# Patient Record
Sex: MALE | URBAN - METROPOLITAN AREA
[De-identification: names, ages, dates, MRNs, and addresses within clinical notes are randomized per-mention and may not be internally consistent; named-entity substitution may affect disease eponyms.]

---

## 2024-12-23 ENCOUNTER — OFFICE VISIT (OUTPATIENT)
Dept: URGENT CARE | Age: 54
End: 2024-12-23
Payer: COMMERCIAL

## 2024-12-23 ENCOUNTER — ANCILLARY PROCEDURE (OUTPATIENT)
Dept: URGENT CARE | Age: 54
End: 2024-12-23
Payer: COMMERCIAL

## 2024-12-23 VITALS
TEMPERATURE: 98.6 F | SYSTOLIC BLOOD PRESSURE: 141 MMHG | OXYGEN SATURATION: 97 % | DIASTOLIC BLOOD PRESSURE: 91 MMHG | HEART RATE: 88 BPM

## 2024-12-23 DIAGNOSIS — J06.9 UPPER RESPIRATORY TRACT INFECTION, UNSPECIFIED TYPE: ICD-10-CM

## 2024-12-23 DIAGNOSIS — R09.89 CHEST CONGESTION: Primary | ICD-10-CM

## 2024-12-23 LAB
POC BINAX EXPIRATION: NEGATIVE
POC BINAX NOW COVID SERIAL NUMBER: NEGATIVE
POC RAPID INFLUENZA A: NEGATIVE
POC RAPID INFLUENZA B: NEGATIVE
POC SARS-COV-2 AG BINAX: NORMAL

## 2024-12-23 PROCEDURE — 71046 X-RAY EXAM CHEST 2 VIEWS: CPT

## 2024-12-23 PROCEDURE — 99204 OFFICE O/P NEW MOD 45 MIN: CPT

## 2024-12-23 PROCEDURE — 87811 SARS-COV-2 COVID19 W/OPTIC: CPT

## 2024-12-23 PROCEDURE — 87804 INFLUENZA ASSAY W/OPTIC: CPT

## 2024-12-23 RX ORDER — LORATADINE 10 MG/1
10 TABLET ORAL DAILY
COMMUNITY

## 2024-12-23 RX ORDER — LOSARTAN POTASSIUM 50 MG/1
50 TABLET ORAL DAILY
COMMUNITY

## 2024-12-23 RX ORDER — OMEPRAZOLE 20 MG/1
20 CAPSULE, DELAYED RELEASE ORAL
COMMUNITY

## 2024-12-23 ASSESSMENT — ENCOUNTER SYMPTOMS: COUGH: 1

## 2024-12-23 NOTE — PROGRESS NOTES
Subjective   Patient ID: James Menon is a 54 y.o. male. They present today with a chief complaint of Sore Throat (From cough ) and Cough (Pt states exposed to pneumonia, says in throat. Feels like tickle. Hear crackle in throat ).    History of Present Illness  Patient is a 54-year-old male with no reported past medical history presents urgent care today with concern for possible pneumonia.  He states his wife and daughter were both recently diagnosed with this condition.  He states he has had a cough and tickle in his throat for over a year however recently, he notes his cough is gotten worse.  He has been using over-the-counter medication without significant relief.  He denies any chest pain, fevers Lissa just went to get the guidance of smears the formalin gene gene only pneumonia assessment rate really sure or shortness of breath.  No other complaints or concerns mention this time.      History provided by:  Patient  Sore Throat   Associated symptoms include congestion and coughing.   Cough        Past Medical History  Allergies as of 12/23/2024 - never reviewed   Allergen Reaction Noted    Sulfa (sulfonamide antibiotics) Rash 12/23/2024       (Not in a hospital admission)         No past medical history on file.    No past surgical history on file.         Review of Systems  Review of Systems   HENT:  Positive for congestion.    Respiratory:  Positive for cough.                                   Objective    Vitals:    12/23/24 1209   BP: (!) 141/91   Pulse: 88   Temp: 37 °C (98.6 °F)   SpO2: 97%     No LMP for male patient.    Physical Exam    Procedures      Assessment/Plan   Allergies, medications, history, and pertinent labs/EKGs/Imaging reviewed by STEPH De Paz.     Medical Decision Making  Patient is well appearing, afebrile, non toxic, not hypoxic, and appropriate for outpatient treatment and management at time of evaluation. Patient presents with flulike symptoms as described above.      Differential includes but not limited to: COVID, influenza, pneumonia, URI, other    Rapid flu and COVID are negative.  Chest x-ray ordered.  Image independently reviewed by myself and interpreted as no evidence of acute cardiopulmonary process.    I discussed these finds with the patient.  At this time, I feel his symptoms are most likely secondary to a viral upper respiratory infection.  Recommended continued use of over-the-counter medication as needed for symptom relief and close follow-up with PCP.  Red flags and ER precautions discussed.  Patient is here with this plan.  He was discharged stable condition.  All questions and concerns addressed        Orders and Diagnoses  Diagnoses and all orders for this visit:  Chest congestion  -     XR chest 2 views    === 12/23/24 ===    XR CHEST 2 VIEWS    - Impression -  1.  No evidence of acute cardiopulmonary process.      Signed by: Drew Olivera 12/23/2024 1:04 PM  Dictation workstation:   [id number]    Medical Admin Record      Follow Up Instructions  No follow-ups on file.    Patient disposition: Home    Electronically signed by STEPH De Paz  12:38 PM

## 2024-12-23 NOTE — PATIENT INSTRUCTIONS
You were seen at Urgent Care today and diagnosed with a viral upper respiratory infection. Please treat as discussed. Monitor for red flags which we spoke about, If your symptoms change, worsen or become concerning in any way, please go to the emergency room immediately, otherwise you can followup with your PCP in 2-3 days as needed